# Patient Record
Sex: MALE | Race: BLACK OR AFRICAN AMERICAN | NOT HISPANIC OR LATINO | Employment: FULL TIME | URBAN - METROPOLITAN AREA
[De-identification: names, ages, dates, MRNs, and addresses within clinical notes are randomized per-mention and may not be internally consistent; named-entity substitution may affect disease eponyms.]

---

## 2018-07-19 ENCOUNTER — HOSPITAL ENCOUNTER (EMERGENCY)
Facility: HOSPITAL | Age: 28
Discharge: HOME | End: 2018-07-19
Attending: EMERGENCY MEDICINE

## 2018-07-19 VITALS
TEMPERATURE: 98 F | HEIGHT: 70 IN | RESPIRATION RATE: 17 BRPM | OXYGEN SATURATION: 96 % | WEIGHT: 175 LBS | HEART RATE: 90 BPM | DIASTOLIC BLOOD PRESSURE: 83 MMHG | SYSTOLIC BLOOD PRESSURE: 135 MMHG | BODY MASS INDEX: 25.05 KG/M2

## 2018-07-19 DIAGNOSIS — R10.9 FLANK PAIN: ICD-10-CM

## 2018-07-19 DIAGNOSIS — E86.0 MILD DEHYDRATION: Primary | ICD-10-CM

## 2018-07-19 LAB
BILIRUB UR QL STRIP: NEGATIVE
CLARITY UR: CLEAR
COLOR UR: YELLOW
GLUCOSE UR QL STRIP: NEGATIVE
HGB UR QL STRIP: NEGATIVE
KETONES UR QL STRIP: ABNORMAL
LEUKOCYTE ESTERASE UR QL STRIP: NEGATIVE
NITRITE UR QL STRIP: NEGATIVE
PH UR STRIP: 7 [PH] (ref 5–8)
PROT UR QL STRIP: NEGATIVE
SP GR UR STRIP: 1.03 (ref 1–1.03)
URN SPEC COLLECT METH UR: ABNORMAL
UROBILINOGEN UR STRIP-ACNC: ABNORMAL EU/DL

## 2018-07-19 PROCEDURE — 81003 URINALYSIS AUTO W/O SCOPE: CPT

## 2018-07-19 PROCEDURE — 99284 EMERGENCY DEPT VISIT MOD MDM: CPT | Mod: 25

## 2018-07-19 PROCEDURE — 25000003 PHARM REV CODE 250: Performed by: EMERGENCY MEDICINE

## 2018-07-19 RX ORDER — NAPROXEN 375 MG/1
375 TABLET ORAL 2 TIMES DAILY WITH MEALS
Qty: 60 TABLET | Refills: 0 | Status: SHIPPED | OUTPATIENT
Start: 2018-07-19 | End: 2018-07-26

## 2018-07-19 RX ORDER — IBUPROFEN 600 MG/1
600 TABLET ORAL
Status: COMPLETED | OUTPATIENT
Start: 2018-07-19 | End: 2018-07-19

## 2018-07-19 RX ORDER — DIVALPROEX SODIUM 125 MG/1
125 CAPSULE, COATED PELLETS ORAL 2 TIMES DAILY
COMMUNITY

## 2018-07-19 RX ADMIN — IBUPROFEN 600 MG: 600 TABLET ORAL at 07:07

## 2018-07-19 NOTE — ED TRIAGE NOTES
Pt arrived via personal transport with c/o right sided flank pain that is intermittent for the past 4 days; pt denies n/v, fever, chills, sob at this time; pt states it feels like acid reflux sometimes and when the pain comes on he can feel it in his chest as well

## 2018-07-20 NOTE — ED PROVIDER NOTES
"Encounter Date: 7/19/2018    SCRIBE #1 NOTE: I, Fransisco Dhillon, am scribing for, and in the presence of,  Rama Martin MD. I have scribed the following portions of the note - Other sections scribed: HPI, ROS, PE.       History     Chief Complaint   Patient presents with    Flank Pain     right side intermittent flank pain; goes away at night; "comes back after I eat. It's like it sitting here. like I want to throw up. Like I got acid reflux. Like I got gallstones or something." denies emesis or diarrhea; last BM yesterday; denies dysuria     CC: Flank Pain    HPI: 28 y/o male with hx of epilepsy presents to the ED c/o intermittent, moderate (7/10) R sided flank pain. Pt reports that the pain started x4 days ago, went away on the 3rd day, then returned x2 days ago. Pt states that the pain is not present when he goes to sleep or wakes up but returns after eating. Pt describes pain as "sharp". Pt is also c/o epigastric abdominal pain and mild constipation. Pt notes last BM was yesterday, but has had to "strain" more recently to have a BM. Pt reports use of a GERD medication with temporary relief of his epigastric pain. Pt also reports compliance with epilepsy medication. Pt denies n/v/d, dysuria, fever, chills, hematuria, or any other associated symptoms. No other tx PTA.       The history is provided by the patient. No  was used.     Review of patient's allergies indicates:  No Known Allergies  Past Medical History:   Diagnosis Date    Epilepsy      History reviewed. No pertinent surgical history.  History reviewed. No pertinent family history.  Social History   Substance Use Topics    Smoking status: Never Smoker    Smokeless tobacco: Not on file    Alcohol use Yes     Review of Systems   Constitutional: Negative for chills and fever.   HENT: Negative for congestion, ear pain, rhinorrhea and sore throat.    Eyes: Negative for pain and redness.   Respiratory: Negative for cough and shortness " of breath.    Cardiovascular: Negative for chest pain.   Gastrointestinal: Positive for abdominal pain. Negative for diarrhea, nausea and vomiting.   Genitourinary: Positive for flank pain. Negative for dysuria, frequency, hematuria and urgency.   Musculoskeletal: Negative for back pain and neck pain.   Skin: Negative for rash.   Neurological: Negative for weakness, light-headedness, numbness and headaches.   All other systems reviewed and are negative.      Physical Exam     Initial Vitals [07/19/18 1832]   BP Pulse Resp Temp SpO2   139/79 93 20 98.2 °F (36.8 °C) 100 %      MAP       --         Physical Exam    Nursing note and vitals reviewed.  Constitutional: He appears well-developed and well-nourished. He is not diaphoretic. No distress.   HENT:   Head: Normocephalic and atraumatic.   Nose: Nose normal.   Eyes: Conjunctivae and EOM are normal. Pupils are equal, round, and reactive to light. No scleral icterus.   Neck: Normal range of motion. Neck supple.   Cardiovascular: Normal rate, regular rhythm, normal heart sounds and intact distal pulses. Exam reveals no gallop and no friction rub.    No murmur heard.  Pulmonary/Chest: Breath sounds normal. No stridor. No respiratory distress. He has no wheezes. He has no rhonchi. He has no rales.   Abdominal: Soft. Bowel sounds are normal. He exhibits no distension. There is no tenderness. There is no rebound and no guarding.   Musculoskeletal: Normal range of motion. He exhibits tenderness (Pt has right sided CVA tenderness.). He exhibits no edema.   Neurological: He is alert and oriented to person, place, and time. He has normal strength. No cranial nerve deficit.   Skin: Skin is warm and dry. No rash noted.   Psychiatric: He has a normal mood and affect. His behavior is normal.         ED Course   Procedures  Labs Reviewed   URINALYSIS - Abnormal; Notable for the following:        Result Value    Ketones, UA 1+ (*)     Urobilinogen, UA 2.0-3.0 (*)     All other  components within normal limits          Imaging Results          CT Renal Stone Study Abd Pelvis WO (Final result)  Result time 07/19/18 20:28:28    Final result by Fatmata Moser MD (07/19/18 20:28:28)                 Impression:      No acute intra-abdominal abnormalities identified.  No evidence of stones or obstructive uropathy.      Electronically signed by: Fatmata Moser MD  Date:    07/19/2018  Time:    20:28             Narrative:    EXAMINATION:  CT RENAL STONE STUDY ABD PELVIS WO    CLINICAL HISTORY:  Flank pain, stone disease suspected;    TECHNIQUE:  Low dose axial images, sagittal and coronal reformations were obtained from the lung bases to the pubic symphysis.  Contrast was not administered.    COMPARISON:  None    FINDINGS:  The visualized portion of the heart is unremarkable.  The lung bases are clear.    No significant hepatic abnormality seen on this noncontrast exam.  There is no intra-or extrahepatic biliary ductal dilatation.  Gallbladder is contracted.  The stomach, pancreas, and adrenal glands are unremarkable.  Spleen is mildly enlarged.    Kidneys show no evidence of stones or hydronephrosis. Ureters are unremarkable along their courses.  Urinary bladder and prostate are unremarkable.    Appendix is visualized and is unremarkable.  The visualized loops of small and large bowel show no evidence of obstruction or inflammation.  No free air or free fluid.    Aorta tapers normally.  Multiple prominent mesenteric lymph nodes are seen which do not meet CT criteria for enlargement.    No acute osseous abnormality identified. Subcutaneous soft tissue structures are unremarkable.                                            Scribe Attestation:   Scribe #1: I performed the above scribed service and the documentation accurately describes the services I performed. I attest to the accuracy of the note.    Attending Attestation:           Physician Attestation for Scribe:  Physician Attestation  Statement for Scribe #1: I, Rama Martin MD, reviewed documentation, as scribed by Fransisco Dhillon in my presence, and it is both accurate and complete.                 ED Course as of Jul 19 2143   Thu Jul 19, 2018 2136 Renal stone CT reviewed and discussed with the patient  [MH]   2136 UA consistent with mild dehydration.  []      ED Course User Index  [MH] Rama Martin MD     Clinical Impression:   The primary encounter diagnosis was Mild dehydration. A diagnosis of Flank pain was also pertinent to this visit.         Medical decision making:  I discussed with the patient and the family member the results of the CT scan and urinalysis.  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 naproxen and follow up with his PCP for recheck.                    Rama Martin MD  07/19/18 2142

## 2018-07-20 NOTE — DISCHARGE INSTRUCTIONS
We did not find a medical condition that required inpatient admission at this time. It is important to remember that any patient's condition may change, and we cannot predict how you will be feeling tomorrow or the next day, so if you have any worsening or new symptoms, you should not hesitate to return to the emergency department for reevaluation.  It is also important to follow up with your primary care physician to determine that the condition that you are being treated for today has completely resolved and has been optimally managed.

## 2024-01-01 ENCOUNTER — HOSPITAL ENCOUNTER (EMERGENCY)
Facility: HOSPITAL | Age: 34
Discharge: HOME | End: 2024-01-01
Attending: FAMILY MEDICINE

## 2024-01-01 VITALS
BODY MASS INDEX: 26.6 KG/M2 | TEMPERATURE: 100 F | WEIGHT: 190 LBS | OXYGEN SATURATION: 99 % | SYSTOLIC BLOOD PRESSURE: 152 MMHG | DIASTOLIC BLOOD PRESSURE: 92 MMHG | RESPIRATION RATE: 18 BRPM | HEART RATE: 102 BPM | HEIGHT: 71 IN

## 2024-01-01 DIAGNOSIS — J02.0 STREP PHARYNGITIS: Primary | ICD-10-CM

## 2024-01-01 DIAGNOSIS — J10.1 INFLUENZA B: ICD-10-CM

## 2024-01-01 LAB
FLUAV AG UPPER RESP QL IA.RAPID: NOT DETECTED
FLUBV AG UPPER RESP QL IA.RAPID: DETECTED
SARS-COV-2 RNA RESP QL NAA+PROBE: NOT DETECTED
STREP A PCR (OHS): DETECTED

## 2024-01-01 PROCEDURE — 0240U COVID/FLU A&B PCR: CPT | Performed by: STUDENT IN AN ORGANIZED HEALTH CARE EDUCATION/TRAINING PROGRAM

## 2024-01-01 PROCEDURE — 25000003 PHARM REV CODE 250

## 2024-01-01 PROCEDURE — 63600175 PHARM REV CODE 636 W HCPCS

## 2024-01-01 PROCEDURE — 87651 STREP A DNA AMP PROBE: CPT

## 2024-01-01 PROCEDURE — 99284 EMERGENCY DEPT VISIT MOD MDM: CPT

## 2024-01-01 PROCEDURE — 96372 THER/PROPH/DIAG INJ SC/IM: CPT

## 2024-01-01 RX ORDER — FLUTICASONE PROPIONATE 50 MCG
1 SPRAY, SUSPENSION (ML) NASAL 2 TIMES DAILY PRN
Qty: 15 G | Refills: 0 | Status: SHIPPED | OUTPATIENT
Start: 2024-01-01

## 2024-01-01 RX ORDER — AMOXICILLIN 500 MG/1
500 CAPSULE ORAL EVERY 12 HOURS
Qty: 20 CAPSULE | Refills: 0 | Status: SHIPPED | OUTPATIENT
Start: 2024-01-01 | End: 2024-01-11

## 2024-01-01 RX ORDER — BENZONATATE 100 MG/1
100 CAPSULE ORAL 3 TIMES DAILY PRN
Qty: 15 CAPSULE | Refills: 0 | Status: SHIPPED | OUTPATIENT
Start: 2024-01-01

## 2024-01-01 RX ORDER — ACETAMINOPHEN 500 MG
1000 TABLET ORAL
Status: COMPLETED | OUTPATIENT
Start: 2024-01-01 | End: 2024-01-01

## 2024-01-01 RX ORDER — DEXAMETHASONE SODIUM PHOSPHATE 4 MG/ML
8 INJECTION, SOLUTION INTRA-ARTICULAR; INTRALESIONAL; INTRAMUSCULAR; INTRAVENOUS; SOFT TISSUE
Status: COMPLETED | OUTPATIENT
Start: 2024-01-01 | End: 2024-01-01

## 2024-01-01 RX ADMIN — DEXAMETHASONE SODIUM PHOSPHATE 8 MG: 4 INJECTION, SOLUTION INTRA-ARTICULAR; INTRALESIONAL; INTRAMUSCULAR; INTRAVENOUS; SOFT TISSUE at 07:01

## 2024-01-01 RX ADMIN — ACETAMINOPHEN 1000 MG: 500 TABLET ORAL at 07:01

## 2024-01-01 NOTE — Clinical Note
"Norbert "Norbert"Jael was seen and treated in our emergency department on 1/1/2024.  He may return to work on 01/04/2024.       If you have any questions or concerns, please don't hesitate to call.      Antoinette Fuentes, NP"

## 2024-01-02 NOTE — ED PROVIDER NOTES
Encounter Date: 1/1/2024       History     Chief Complaint   Patient presents with    Cough     Cough, sore throat and fever for 3 days. Wife has flu and strep     33 y.o.  male presents to Emergency Department with a chief complaint of cough. Symptoms began several days ago and have been constant since onset. Reports entire household is sick; wife has strep and flu. Associated symptoms include sore throat, fever, and congestion. Symptoms are aggravated with exertion and there are no alleviating factors. The patient denies CP, SOB, wheezing, vomiting, diarrhea, or dizziness. No other reported symptoms at this time      The history is provided by the patient. No  was used.   Cough  This is a new problem. The current episode started several days ago. The problem occurs every few minutes. The problem has been unchanged. The cough is Non-productive. The maximum temperature recorded prior to his arrival was 100 - 100.9 F. The fever has been present for 3 to 4 days. Associated symptoms include rhinorrhea and sore throat. Pertinent negatives include no chest pain, no chills, no shortness of breath and no wheezing. He has tried nothing for the symptoms.     Review of patient's allergies indicates:  No Known Allergies  Past Medical History:   Diagnosis Date    Epilepsy      History reviewed. No pertinent surgical history.  No family history on file.  Social History     Tobacco Use    Smoking status: Never   Substance Use Topics    Alcohol use: Yes     Review of Systems   Constitutional:  Positive for fever. Negative for chills and fatigue.   HENT:  Positive for congestion, rhinorrhea and sore throat. Negative for trouble swallowing and voice change.    Respiratory:  Positive for cough. Negative for shortness of breath, wheezing and stridor.    Cardiovascular:  Negative for chest pain, palpitations and leg swelling.   Gastrointestinal:  Negative for diarrhea, nausea and vomiting.   All  other systems reviewed and are negative.      Physical Exam     Initial Vitals [01/01/24 1751]   BP Pulse Resp Temp SpO2   (!) 152/92 102 18 100.3 °F (37.9 °C) 99 %      MAP       --         Physical Exam    Nursing note and vitals reviewed.  Constitutional: He appears well-developed and well-nourished. He is not diaphoretic. He is cooperative.  Non-toxic appearance. No distress.   HENT:   Head: Normocephalic and atraumatic.   Right Ear: Hearing, tympanic membrane, external ear and ear canal normal.   Left Ear: Hearing, tympanic membrane, external ear and ear canal normal.   Nose: Rhinorrhea present.   Mouth/Throat: Posterior oropharyngeal edema and posterior oropharyngeal erythema present. No oropharyngeal exudate or tonsillar abscesses.   2 + symmetric tonsils.    Eyes: Conjunctivae and EOM are normal. Pupils are equal, round, and reactive to light.   Neck: Neck supple.   Normal range of motion.  Cardiovascular:  Normal rate, regular rhythm, S1 normal, S2 normal, normal heart sounds, intact distal pulses and normal pulses.           Pulmonary/Chest: Effort normal and breath sounds normal. No tachypnea and no bradypnea. No respiratory distress. He has no decreased breath sounds. He has no wheezes. He has no rhonchi. He has no rales. He exhibits no tenderness.   Abdominal: Abdomen is soft. Bowel sounds are normal. He exhibits no distension. There is no abdominal tenderness. There is no rebound.   Musculoskeletal:         General: Normal range of motion.      Cervical back: Normal range of motion and neck supple.     Neurological: He is alert and oriented to person, place, and time. He has normal strength. No sensory deficit. GCS score is 15. GCS eye subscore is 4. GCS verbal subscore is 5. GCS motor subscore is 6.   Skin: Skin is warm and dry.   Psychiatric: He has a normal mood and affect. Thought content normal.         ED Course   Procedures  Labs Reviewed   COVID/FLU A&B PCR - Abnormal; Notable for the following  components:       Result Value    Influenza B PCR Detected (*)     All other components within normal limits    Narrative:     The Xpert Xpress SARS-CoV-2/FLU/RSV plus is a rapid, multiplexed real-time PCR test intended for the simultaneous qualitative detection and differentiation of SARS-CoV-2, Influenza A, Influenza B, and respiratory syncytial virus (RSV) viral RNA in either nasopharyngeal swab or nasal swab specimens.         STREP GROUP A BY PCR - Abnormal; Notable for the following components:    STREP A PCR ([facility]) Detected (*)     All other components within normal limits    Narrative:     The Xpert Xpress Strep A test is a rapid, qualitative in vitro diagnostic test for the detection of Streptococcus pyogenes (Group A ß-hemolytic Streptococcus, Strep A) in throat swab specimens from patients with signs and symptoms of pharyngitis.            Imaging Results    None          Medications   dexAMETHasone injection 8 mg (8 mg Intramuscular Given 1/1/24 1915)   acetaminophen tablet 1,000 mg (1,000 mg Oral Given 1/1/24 1915)     Medical Decision Making  Patient awake, alert, has non-labored breathing, and follows commands appropriately. C/o fever, cough, congestion, and sore throat for three days. Febrile upon arrival. NAD noted.         Differential Diagnosis: COVID, Flu, Strep, Viral Syndrome     Amount and/or Complexity of Data Reviewed  Labs: ordered. Decision-making details documented in ED Course.     Details: Flu B & Strep +. Informed patient of results.   Discussion of management or test interpretation with external provider(s): Discussed plan of care and interventions with patient. Agreed to and aware of plan of care. Comfortable being discharged home. Patient discharged home. Patient denies new or additional complaints; no further tests indicated at this time. Verbalized understanding of instructions. No emergent or apparent distress noted prior to discharge. To follow up with PCP in 1 week as needed.  Strict ER return precautions given.       Risk  OTC drugs.  Prescription drug management.               ED Course as of 01/01/24 1917 Mon Jan 01, 2024 1848 STREP A PCR ([facility])(!): Detected [JA]   1848 Influenza B, Molecular(!): Detected [JA]      ED Course User Index  [JA] Antoinette Fuentes NP                           Clinical Impression:  Final diagnoses:  [J02.0] Strep pharyngitis (Primary)  [J10.1] Influenza B          ED Disposition Condition    Discharge Stable          ED Prescriptions       Medication Sig Dispense Start Date End Date Auth. Provider    amoxicillin (AMOXIL) 500 MG capsule Take 1 capsule (500 mg total) by mouth every 12 (twelve) hours. for 10 days 20 capsule 1/1/2024 1/11/2024 Antoinette Fuentes NP    benzonatate (TESSALON) 100 MG capsule Take 1 capsule (100 mg total) by mouth 3 (three) times daily as needed for Cough. 15 capsule 1/1/2024 -- Antoinette Fuentes NP    fluticasone propionate (FLONASE) 50 mcg/actuation nasal spray 1 spray (50 mcg total) by Each Nostril route 2 (two) times daily as needed for Rhinitis or Allergies. 15 g 1/1/2024 -- Antoinette Fuentes NP          Follow-up Information       Follow up With Specialties Details Why Contact Info    PCP  Call in 1 week As needed, If symptoms worsen     [facility] - Emergency Dept Emergency Medicine Go to  If symptoms worsen, As needed [address]  [facility] [id number] [phone]             Antoinette Fuentes NP  01/01/24 1917